# Patient Record
Sex: MALE | Race: WHITE | NOT HISPANIC OR LATINO | URBAN - METROPOLITAN AREA
[De-identification: names, ages, dates, MRNs, and addresses within clinical notes are randomized per-mention and may not be internally consistent; named-entity substitution may affect disease eponyms.]

---

## 2017-01-03 ENCOUNTER — HOSPITAL ENCOUNTER (OUTPATIENT)
Dept: RADIOLOGY | Facility: CLINIC | Age: 52
Discharge: HOME | End: 2017-01-03
Attending: OTOLARYNGOLOGY

## 2017-01-03 DIAGNOSIS — Z85.818 PERSONAL HISTORY OF MALIGNANT NEOPLASM OF OTHER SITES OF LIP, ORAL CAVITY, AND PHARYNX: ICD-10-CM

## 2017-01-05 ENCOUNTER — RECORDS - HEALTHEAST (OUTPATIENT)
Dept: ADMINISTRATIVE | Facility: OTHER | Age: 52
End: 2017-01-05

## 2017-01-05 ENCOUNTER — HOSPITAL ENCOUNTER (OUTPATIENT)
Dept: RADIOLOGY | Facility: CLINIC | Age: 52
Discharge: HOME | End: 2017-01-05
Attending: INTERNAL MEDICINE

## 2017-01-05 DIAGNOSIS — C01 CANCER OF BASE OF TONGUE (H): ICD-10-CM

## 2017-01-05 DIAGNOSIS — R09.89 CHEST CONGESTION: ICD-10-CM

## 2017-02-22 ENCOUNTER — RECORDS - HEALTHEAST (OUTPATIENT)
Dept: ADMINISTRATIVE | Facility: OTHER | Age: 52
End: 2017-02-22

## 2017-02-24 ENCOUNTER — HOSPITAL ENCOUNTER (OUTPATIENT)
Dept: INTERVENTIONAL RADIOLOGY/VASCULAR | Facility: HOSPITAL | Age: 52
Discharge: HOME | End: 2017-02-24
Attending: INTERNAL MEDICINE

## 2017-02-24 DIAGNOSIS — C10.9 OROPHARYNGEAL CANCER (H): ICD-10-CM

## 2017-02-24 RX ORDER — OMEPRAZOLE 10 MG/1
10 CAPSULE, DELAYED RELEASE ORAL
Status: SHIPPED | COMMUNITY
Start: 2017-02-24

## 2017-02-24 RX ORDER — ESCITALOPRAM OXALATE 10 MG/1
10 TABLET ORAL DAILY
Status: SHIPPED | COMMUNITY
Start: 2017-02-24

## 2017-02-24 ASSESSMENT — MIFFLIN-ST. JEOR: SCORE: 1733.5

## 2017-02-27 ENCOUNTER — COMMUNICATION - HEALTHEAST (OUTPATIENT)
Dept: INTERVENTIONAL RADIOLOGY/VASCULAR | Facility: HOSPITAL | Age: 52
End: 2017-02-27

## 2017-05-05 ENCOUNTER — RECORDS - HEALTHEAST (OUTPATIENT)
Dept: ADMINISTRATIVE | Facility: OTHER | Age: 52
End: 2017-05-05

## 2017-05-24 ENCOUNTER — HOSPITAL ENCOUNTER (OUTPATIENT)
Dept: RADIOLOGY | Facility: CLINIC | Age: 52
Discharge: HOME | End: 2017-05-24
Attending: OTOLARYNGOLOGY

## 2017-05-24 ENCOUNTER — COMMUNICATION - HEALTHEAST (OUTPATIENT)
Dept: TELEHEALTH | Facility: CLINIC | Age: 52
End: 2017-05-24

## 2017-05-24 DIAGNOSIS — R13.10 DYSPHAGIA: ICD-10-CM

## 2017-08-22 ENCOUNTER — RECORDS - HEALTHEAST (OUTPATIENT)
Dept: ADMINISTRATIVE | Facility: OTHER | Age: 52
End: 2017-08-22

## 2018-01-31 ENCOUNTER — HOSPITAL ENCOUNTER (OUTPATIENT)
Dept: CT IMAGING | Facility: HOSPITAL | Age: 53
Discharge: HOME | End: 2018-01-31
Attending: INTERNAL MEDICINE

## 2018-01-31 DIAGNOSIS — C34.90 NON-SMALL CELL LUNG CANCER (H): ICD-10-CM

## 2018-01-31 DIAGNOSIS — C34.11 CANCER OF UPPER LOBE OF RIGHT LUNG (H): ICD-10-CM

## 2021-05-25 ENCOUNTER — RECORDS - HEALTHEAST (OUTPATIENT)
Dept: ADMINISTRATIVE | Facility: CLINIC | Age: 56
End: 2021-05-25

## 2021-05-30 VITALS — WEIGHT: 191 LBS | HEIGHT: 71 IN | BODY MASS INDEX: 26.74 KG/M2

## 2021-06-09 NOTE — H&P
[facility] Radiology History and Physical Note    Procedure Requested: RIGHT IJ port removal  Requesting Provider: Nitish Guerrero MD    HPI: Дмитрий Banks is a 51 y.o. 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       No current facility-administered medications on file prior to encounter.      LABS:    HGB, PLT, INR all pending     EXAM:    General: Stable. In no acute distress.  Neuro: A&O x 3. Moves all extremities equally.  Resp: Lungs clear to auscultation bilaterally.  Cardio: S1S2 and reg, without murmur, clicks or rubs  Abdomen: Soft, non-distended, non-tender, positive bowel sounds.    Pre-Sedation Assessment:  Mallampati Airway Classification: Class 1: entire tonsil clearly visble  Previous reaction to anesthesia/sedation: no  Sedation plan based on assessment: Moderate  Sleep Apnea: YES-CPAP  Dentures: no  COPD: no  ASA Classification: ASA 2 - Patient with mild systemic disease with no functional limitations    ASSESSMENT:   SCC of tongue; Finished with treatment    PLAN: RIGHT IJ port removal    The procedure, risks and moderate sedation were discussed with the patient, all questions answered and patient agrees to proceed with the procedure. Written consent  obtained.  Await pending labs    Arlen Ling, CNP  [facility]: Interventional Radiology   [phone]

## 2021-06-09 NOTE — PROCEDURES
Interventional Radiology Post-Procedure Note   [facility]  ?   Brief Procedure Note:   Patient name: Дмитрий Banks  Pt MRN:[id number]   Date of procedure: 2/24/2017     Procedure(s): Removal of Port-a-cath  Sedation method: Moderate sedation was employed. The patient was monitored by an interventional radiology nurse at all times throughout the procedure under my direct guidance.  Pre Procedure Diagnosis: Squamous cell carcinoma of the tongue  Post Procedure Diagnosis: Same  Indications: Port-a-cath is no longer needed, completion of therapy   ?   Attending: Kamaljit Stapleton M.D.  Specimen(s) removed: Intact Port-a-cath and attached catheter, no pericatheter thrombus noted  Additional studies ordered: None  Drains: None   Estimated Blood Loss: Minimal  Complications: None  Vascular closure method: Manual pressure   Findings/Notes/Comments: Uncomplicated removal of Port-a-cath.    ?   Please see dictation in PACS or under the Imaging tab in [facility] for detailed procedure note.     Kamaljit Stapleton M.D.   Vascular and Interventional Radiology   Pager: [phone]   After Hours / Scheduling: [phone]     2/24/2017  9:11 AM